# Patient Record
Sex: MALE | Race: WHITE | URBAN - NONMETROPOLITAN AREA
[De-identification: names, ages, dates, MRNs, and addresses within clinical notes are randomized per-mention and may not be internally consistent; named-entity substitution may affect disease eponyms.]

---

## 2021-05-10 ENCOUNTER — OFFICE VISIT (OUTPATIENT)
Dept: URBAN - NONMETROPOLITAN AREA CLINIC 4 | Facility: CLINIC | Age: 51
End: 2021-05-10

## 2021-06-21 ENCOUNTER — LAB OUTSIDE AN ENCOUNTER (OUTPATIENT)
Dept: URBAN - NONMETROPOLITAN AREA CLINIC 4 | Facility: CLINIC | Age: 51
End: 2021-06-21

## 2021-06-21 ENCOUNTER — OFFICE VISIT (OUTPATIENT)
Dept: URBAN - NONMETROPOLITAN AREA CLINIC 4 | Facility: CLINIC | Age: 51
End: 2021-06-21
Payer: COMMERCIAL

## 2021-06-21 ENCOUNTER — WEB ENCOUNTER (OUTPATIENT)
Dept: URBAN - NONMETROPOLITAN AREA CLINIC 4 | Facility: CLINIC | Age: 51
End: 2021-06-21

## 2021-06-21 DIAGNOSIS — Z86.010 PERSONAL HISTORY OF COLON POLYPS: ICD-10-CM

## 2021-06-21 DIAGNOSIS — K92.1 BLOOD IN THE STOOL: ICD-10-CM

## 2021-06-21 PROCEDURE — G8420 CALC BMI NORM PARAMETERS: HCPCS | Performed by: INTERNAL MEDICINE

## 2021-06-21 PROCEDURE — G9903 PT SCRN TBCO ID AS NON USER: HCPCS | Performed by: INTERNAL MEDICINE

## 2021-06-21 PROCEDURE — G8427 DOCREV CUR MEDS BY ELIG CLIN: HCPCS | Performed by: INTERNAL MEDICINE

## 2021-06-21 PROCEDURE — 99204 OFFICE O/P NEW MOD 45 MIN: CPT | Performed by: INTERNAL MEDICINE

## 2021-06-21 RX ORDER — SODIUM, POTASSIUM,MAG SULFATES 17.5-3.13G
TAKE 325 ML SOLUTION, RECONSTITUTED, ORAL ORAL ONCE
Qty: 1 | Refills: 0 | OUTPATIENT
Start: 2021-06-21 | End: 2021-06-22

## 2021-06-21 RX ORDER — DILTIAZEM HYDROCHLORIDE 120 MG/1
(PRIOR AUTH#:000006563312) TABLET ORAL
Qty: 60 DELAYED RELEASE TABLET | Status: ACTIVE | COMMUNITY

## 2021-06-21 RX ORDER — SPIRONOLACTONE 25 MG/1
(PRIOR AUTH#:000006580574) TABLET, FILM COATED ORAL
Qty: 30 DELAYED RELEASE TABLET | Status: ACTIVE | COMMUNITY

## 2021-06-21 RX ORDER — LOSARTAN POTASSIUM 100 MG/1
(PRIOR AUTH#:000006573867) TABLET, FILM COATED ORAL
Qty: 30 DELAYED RELEASE TABLET | Status: ACTIVE | COMMUNITY

## 2021-06-21 NOTE — HPI-OTHER HISTORIES
11/10/2014 I have seen Mr. Esau Luis in our [facility] office.  As you may recall, he is a 44-year-old  male who came to the office for colonoscopy evaluation.  He was upset because we could not schedule him for direct colonoscopy evaluation.  In his questionnaire, he mentioned symptoms of diarrhea and prior colonoscopy.  The patient stated that he has GI issues all his life.  He has symptoms sometimes of constipation and sometimes diarrhea, recently more loose stools, noted some mucus but not blood.  He does report having some abdominal cramping.  He also reports having dark stools intermittently.  He denies taking NSAIDs.  He has been diagnosed with vitiligo and also he reports having some blisters and rashes on his body at random and he has seen a dermatologist for that but so far he has not had a diagnosis.  He had a prior colonoscopy about 3 years ago.  He had 2 precancerous polyps.

## 2021-06-21 NOTE — HPI-TODAY'S VISIT:
06/21/2021 Patient presents for a surveillance colonoscopy and evaluation of blood in the stool. Previous colonoscopy in 2015 showed three 1cm polyps in the ascending colon. Patient reports black and red stool, but he believes it is due to hemorrhoids. The blood is present in the commode. He denies any pain medication use or blood thinners. No family history of colon cancer or polyps. Patient says he prefers to have colonoscopy done at [facility] and not at the [facility].

## 2021-07-16 ENCOUNTER — OFFICE VISIT (OUTPATIENT)
Dept: URBAN - METROPOLITAN AREA SURGERY CENTER 13 | Facility: SURGERY CENTER | Age: 51
End: 2021-07-16

## 2021-07-19 ENCOUNTER — TELEPHONE ENCOUNTER (OUTPATIENT)
Dept: URBAN - METROPOLITAN AREA CLINIC 96 | Facility: CLINIC | Age: 51
End: 2021-07-19

## 2021-07-22 ENCOUNTER — OFFICE VISIT (OUTPATIENT)
Dept: URBAN - METROPOLITAN AREA SURGERY CENTER 13 | Facility: SURGERY CENTER | Age: 51
End: 2021-07-22
Payer: COMMERCIAL

## 2021-07-22 DIAGNOSIS — K92.1 BLACK STOOL: ICD-10-CM

## 2021-07-22 DIAGNOSIS — D12.2 ADENOMA OF ASCENDING COLON: ICD-10-CM

## 2021-07-22 PROCEDURE — G8907 PT DOC NO EVENTS ON DISCHARG: HCPCS | Performed by: INTERNAL MEDICINE

## 2021-07-22 PROCEDURE — 45385 COLONOSCOPY W/LESION REMOVAL: CPT | Performed by: INTERNAL MEDICINE

## 2021-07-22 PROCEDURE — 45381 COLONOSCOPY SUBMUCOUS NJX: CPT | Performed by: INTERNAL MEDICINE

## 2021-07-22 RX ORDER — SPIRONOLACTONE 25 MG/1
(PRIOR AUTH#:000006580574) TABLET, FILM COATED ORAL
Qty: 30 DELAYED RELEASE TABLET | Status: ACTIVE | COMMUNITY

## 2021-07-22 RX ORDER — LOSARTAN POTASSIUM 100 MG/1
(PRIOR AUTH#:000006573867) TABLET, FILM COATED ORAL
Qty: 30 DELAYED RELEASE TABLET | Status: ACTIVE | COMMUNITY

## 2021-07-22 RX ORDER — DILTIAZEM HYDROCHLORIDE 120 MG/1
(PRIOR AUTH#:000006563312) TABLET ORAL
Qty: 60 DELAYED RELEASE TABLET | Status: ACTIVE | COMMUNITY

## 2021-07-23 ENCOUNTER — OFFICE VISIT (OUTPATIENT)
Dept: URBAN - METROPOLITAN AREA SURGERY CENTER 13 | Facility: SURGERY CENTER | Age: 51
End: 2021-07-23

## 2021-08-12 ENCOUNTER — TELEPHONE ENCOUNTER (OUTPATIENT)
Dept: URBAN - METROPOLITAN AREA CLINIC 82 | Facility: CLINIC | Age: 51
End: 2021-08-12

## 2022-05-03 ENCOUNTER — OFFICE VISIT (OUTPATIENT)
Dept: URBAN - NONMETROPOLITAN AREA CLINIC 4 | Facility: CLINIC | Age: 52
End: 2022-05-03
Payer: COMMERCIAL

## 2022-05-03 ENCOUNTER — LAB OUTSIDE AN ENCOUNTER (OUTPATIENT)
Dept: URBAN - NONMETROPOLITAN AREA CLINIC 4 | Facility: CLINIC | Age: 52
End: 2022-05-03

## 2022-05-03 ENCOUNTER — DASHBOARD ENCOUNTERS (OUTPATIENT)
Age: 52
End: 2022-05-03

## 2022-05-03 VITALS
DIASTOLIC BLOOD PRESSURE: 111 MMHG | SYSTOLIC BLOOD PRESSURE: 189 MMHG | BODY MASS INDEX: 29.3 KG/M2 | TEMPERATURE: 97.9 F | WEIGHT: 197.8 LBS | HEIGHT: 69 IN | HEART RATE: 94 BPM

## 2022-05-03 DIAGNOSIS — Z86.010 PERSONAL HISTORY OF COLONIC POLYPS: ICD-10-CM

## 2022-05-03 DIAGNOSIS — K62.5 RECTAL BLEEDING: ICD-10-CM

## 2022-05-03 DIAGNOSIS — K64.9 HEMORRHOIDS, UNSPECIFIED HEMORRHOID TYPE: ICD-10-CM

## 2022-05-03 DIAGNOSIS — K21.9 GASTROESOPHAGEAL REFLUX DISEASE, UNSPECIFIED WHETHER ESOPHAGITIS PRESENT: ICD-10-CM

## 2022-05-03 PROBLEM — 235595009: Status: ACTIVE | Noted: 2022-05-03

## 2022-05-03 PROCEDURE — 99214 OFFICE O/P EST MOD 30 MIN: CPT | Performed by: REGISTERED NURSE

## 2022-05-03 RX ORDER — POLYETHYLENE GLYCOL 3350, SODIUM SULFATE, SODIUM CHLORIDE, POTASSIUM CHLORIDE, ASCORBIC ACID, SODIUM ASCORBATE 140-9-5.2G
AS DIRECTED KIT ORAL
Qty: 1 BOTTLE | Refills: 0 | OUTPATIENT
Start: 2022-05-03 | End: 2022-05-04

## 2022-05-03 RX ORDER — DILTIAZEM HYDROCHLORIDE 120 MG/1
(PRIOR AUTH#:000006563312) TABLET ORAL
Qty: 60 DELAYED RELEASE TABLET | Status: ACTIVE | COMMUNITY

## 2022-05-03 RX ORDER — SPIRONOLACTONE 25 MG/1
(PRIOR AUTH#:000006580574) TABLET, FILM COATED ORAL
Qty: 30 DELAYED RELEASE TABLET | Status: ACTIVE | COMMUNITY

## 2022-05-03 RX ORDER — LOSARTAN POTASSIUM 100 MG/1
(PRIOR AUTH#:000006573867) TABLET, FILM COATED ORAL
Qty: 30 DELAYED RELEASE TABLET | Status: ACTIVE | COMMUNITY

## 2022-05-03 NOTE — HPI-TODAY'S VISIT:
5/3/22: Pt is a 50 yo male with PMH of HTN and colon polyps who was referred by Dr. Renu Cortez for colonoscopy. A copy of this document will be sent to the referring physician.  Patient has had 3 colonoscopies in the past. Last one 7/22/21 by Dr. Echeverria: - Hemorrhoids found on perianal exam. - One 30 mm polyp in the ascending colon, removed with mucosal resection. Resected and retrieved. - One 15 mm polyp in the ascending colon, removed with a hot snare. Resected and retrieved. - The examination was otherwise normal. - Mucosal resection was performed. Resection and retrieval were complete. Repeat cscope in 6 months for surveillance was recommended.   There is no family history of colon polyps or cancer. Patient denies change in bowel habits, appetite and weight.  He reports occasional rectal bleeding. Not on any blood thinners. He reports reflux at night. Admits to drinking lemonade and orange juice daily.

## 2022-06-10 PROBLEM — 428283002: Status: ACTIVE | Noted: 2022-05-03

## 2022-06-15 ENCOUNTER — OFFICE VISIT (OUTPATIENT)
Dept: URBAN - METROPOLITAN AREA SURGERY CENTER 14 | Facility: SURGERY CENTER | Age: 52
End: 2022-06-15

## 2022-06-28 ENCOUNTER — OFFICE VISIT (OUTPATIENT)
Dept: URBAN - METROPOLITAN AREA SURGERY CENTER 14 | Facility: SURGERY CENTER | Age: 52
End: 2022-06-28
Payer: COMMERCIAL

## 2022-06-28 DIAGNOSIS — Z86.010 ADENOMAS PERSONAL HISTORY OF COLONIC POLYPS: ICD-10-CM

## 2022-06-28 PROCEDURE — G8907 PT DOC NO EVENTS ON DISCHARG: HCPCS | Performed by: INTERNAL MEDICINE

## 2022-06-28 PROCEDURE — 45378 DIAGNOSTIC COLONOSCOPY: CPT | Performed by: INTERNAL MEDICINE

## 2022-06-28 RX ORDER — LOSARTAN POTASSIUM 100 MG/1
(PRIOR AUTH#:000006573867) TABLET, FILM COATED ORAL
Qty: 30 DELAYED RELEASE TABLET | Status: ACTIVE | COMMUNITY

## 2022-06-28 RX ORDER — DILTIAZEM HYDROCHLORIDE 120 MG/1
(PRIOR AUTH#:000006563312) TABLET ORAL
Qty: 60 DELAYED RELEASE TABLET | Status: ACTIVE | COMMUNITY

## 2022-06-28 RX ORDER — SPIRONOLACTONE 25 MG/1
(PRIOR AUTH#:000006580574) TABLET, FILM COATED ORAL
Qty: 30 DELAYED RELEASE TABLET | Status: ACTIVE | COMMUNITY

## 2022-06-29 ENCOUNTER — OFFICE VISIT (OUTPATIENT)
Dept: URBAN - METROPOLITAN AREA CLINIC 54 | Facility: CLINIC | Age: 52
End: 2022-06-29

## 2022-07-11 ENCOUNTER — OFFICE VISIT (OUTPATIENT)
Dept: URBAN - METROPOLITAN AREA CLINIC 54 | Facility: CLINIC | Age: 52
End: 2022-07-11

## 2022-07-11 RX ORDER — LOSARTAN POTASSIUM 100 MG/1
(PRIOR AUTH#:000006573867) TABLET, FILM COATED ORAL
Qty: 30 DELAYED RELEASE TABLET | Status: ACTIVE | COMMUNITY

## 2022-07-11 RX ORDER — SPIRONOLACTONE 25 MG/1
(PRIOR AUTH#:000006580574) TABLET, FILM COATED ORAL
Qty: 30 DELAYED RELEASE TABLET | Status: ACTIVE | COMMUNITY

## 2022-07-11 RX ORDER — DILTIAZEM HYDROCHLORIDE 120 MG/1
(PRIOR AUTH#:000006563312) TABLET ORAL
Qty: 60 DELAYED RELEASE TABLET | Status: ACTIVE | COMMUNITY

## 2022-08-26 ENCOUNTER — TELEPHONE ENCOUNTER (OUTPATIENT)
Dept: URBAN - METROPOLITAN AREA CLINIC 54 | Facility: CLINIC | Age: 52
End: 2022-08-26

## 2022-08-26 RX ORDER — ONDANSETRON 4 MG/1
1 TABLET ON THE TONGUE AND ALLOW TO DISSOLVE 30 MIN PRIOR TO PREP TABLET, ORALLY DISINTEGRATING ORAL ONCE A DAY
Qty: 2 | Refills: 0 | OUTPATIENT

## 2022-08-26 RX ORDER — POLYETHYLENE GLYCOL 3350, SODIUM SULFATE ANHYDROUS, SODIUM BICARBONATE, SODIUM CHLORIDE, POTASSIUM CHLORIDE 236; 22.74; 6.74; 5.86; 2.97 G/4L; G/4L; G/4L; G/4L; G/4L
AS DIRECTED POWDER, FOR SOLUTION ORAL
Qty: 1 | Refills: 0 | OUTPATIENT
Start: 2022-08-30 | End: 2022-08-31

## 2022-08-31 PROBLEM — 428283002 HISTORY OF POLYP OF COLON: Status: ACTIVE | Noted: 2021-06-21

## 2022-09-13 ENCOUNTER — CLAIMS CREATED FROM THE CLAIM WINDOW (OUTPATIENT)
Dept: URBAN - METROPOLITAN AREA CLINIC 4 | Facility: CLINIC | Age: 52
End: 2022-09-13
Payer: COMMERCIAL

## 2022-09-13 ENCOUNTER — OFFICE VISIT (OUTPATIENT)
Dept: URBAN - METROPOLITAN AREA SURGERY CENTER 14 | Facility: SURGERY CENTER | Age: 52
End: 2022-09-13
Payer: COMMERCIAL

## 2022-09-13 DIAGNOSIS — D12.2 BENIGN NEOPLASM OF ASCENDING COLON: ICD-10-CM

## 2022-09-13 DIAGNOSIS — Z86.010 ADENOMAS PERSONAL HISTORY OF COLONIC POLYPS: ICD-10-CM

## 2022-09-13 DIAGNOSIS — K63.89 OTHER SPECIFIED DISEASES OF INTESTINE: ICD-10-CM

## 2022-09-13 DIAGNOSIS — K63.89 BACTERIAL OVERGROWTH SYNDROME: ICD-10-CM

## 2022-09-13 DIAGNOSIS — D12.2 ADENOMA OF ASCENDING COLON: ICD-10-CM

## 2022-09-13 PROCEDURE — 45385 COLONOSCOPY W/LESION REMOVAL: CPT | Performed by: INTERNAL MEDICINE

## 2022-09-13 PROCEDURE — G8907 PT DOC NO EVENTS ON DISCHARG: HCPCS | Performed by: INTERNAL MEDICINE

## 2022-09-13 PROCEDURE — 45380 COLONOSCOPY AND BIOPSY: CPT | Performed by: INTERNAL MEDICINE

## 2022-09-13 PROCEDURE — 88305 TISSUE EXAM BY PATHOLOGIST: CPT | Performed by: PATHOLOGY
